# Patient Record
Sex: FEMALE | Race: BLACK OR AFRICAN AMERICAN | NOT HISPANIC OR LATINO
[De-identification: names, ages, dates, MRNs, and addresses within clinical notes are randomized per-mention and may not be internally consistent; named-entity substitution may affect disease eponyms.]

---

## 2017-02-13 ENCOUNTER — APPOINTMENT (OUTPATIENT)
Dept: INTERNAL MEDICINE | Facility: CLINIC | Age: 42
End: 2017-02-13

## 2017-02-13 VITALS
HEART RATE: 86 BPM | WEIGHT: 124 LBS | SYSTOLIC BLOOD PRESSURE: 100 MMHG | DIASTOLIC BLOOD PRESSURE: 72 MMHG | BODY MASS INDEX: 21.7 KG/M2 | OXYGEN SATURATION: 98 % | HEIGHT: 63.5 IN

## 2017-02-13 VITALS — TEMPERATURE: 97.9 F

## 2017-02-14 LAB
ALBUMIN SERPL ELPH-MCNC: 3.8 G/DL
ALP BLD-CCNC: 41 U/L
ALT SERPL-CCNC: 8 U/L
ANION GAP SERPL CALC-SCNC: 15 MMOL/L
AST SERPL-CCNC: 13 U/L
BASOPHILS # BLD AUTO: 0.02 K/UL
BASOPHILS NFR BLD AUTO: 0.4 %
BILIRUB SERPL-MCNC: 0.2 MG/DL
BUN SERPL-MCNC: 12 MG/DL
C TRACH DNA SPEC QL NAA+PROBE: NORMAL
C TRACH RRNA SPEC QL NAA+PROBE: NORMAL
CALCIUM SERPL-MCNC: 9.3 MG/DL
CANDIDA VAG CYTO: NOT DETECTED
CHLORIDE SERPL-SCNC: 102 MMOL/L
CHOLEST SERPL-MCNC: 233 MG/DL
CHOLEST/HDLC SERPL: 4 RATIO
CO2 SERPL-SCNC: 23 MMOL/L
CREAT SERPL-MCNC: 0.92 MG/DL
EOSINOPHIL # BLD AUTO: 0.04 K/UL
EOSINOPHIL NFR BLD AUTO: 0.8 %
G VAGINALIS+PREV SP MTYP VAG QL MICRO: NOT DETECTED
GLUCOSE SERPL-MCNC: 93 MG/DL
HBA1C MFR BLD HPLC: 5.5 %
HCT VFR BLD CALC: 42.2 %
HDLC SERPL-MCNC: 58 MG/DL
HGB BLD-MCNC: 13.8 G/DL
HIV1+2 AB SPEC QL IA.RAPID: NONREACTIVE
IMM GRANULOCYTES NFR BLD AUTO: 0.4 %
LDLC SERPL CALC-MCNC: 156 MG/DL
LYMPHOCYTES # BLD AUTO: 2.78 K/UL
LYMPHOCYTES NFR BLD AUTO: 55.6 %
MAN DIFF?: NORMAL
MCHC RBC-ENTMCNC: 26.2 PG
MCHC RBC-ENTMCNC: 32.7 GM/DL
MCV RBC AUTO: 80.2 FL
MONOCYTES # BLD AUTO: 0.56 K/UL
MONOCYTES NFR BLD AUTO: 11.2 %
N GONORRHOEA DNA SPEC QL NAA+PROBE: NORMAL
N GONORRHOEA RRNA SPEC QL NAA+PROBE: NORMAL
NEUTROPHILS # BLD AUTO: 1.58 K/UL
NEUTROPHILS NFR BLD AUTO: 31.6 %
PLATELET # BLD AUTO: 249 K/UL
POTASSIUM SERPL-SCNC: 4 MMOL/L
PROT SERPL-MCNC: 7.2 G/DL
RBC # BLD: 5.26 M/UL
RBC # FLD: 13.8 %
SODIUM SERPL-SCNC: 140 MMOL/L
SOURCE AMPLIFICATION: NORMAL
T VAGINALIS VAG QL WET PREP: NOT DETECTED
TRIGL SERPL-MCNC: 93 MG/DL
TSH SERPL-ACNC: 1.72 UIU/ML
WBC # FLD AUTO: 5 K/UL

## 2017-08-07 ENCOUNTER — APPOINTMENT (OUTPATIENT)
Dept: INTERNAL MEDICINE | Facility: CLINIC | Age: 42
End: 2017-08-07
Payer: COMMERCIAL

## 2017-08-07 VITALS
SYSTOLIC BLOOD PRESSURE: 90 MMHG | DIASTOLIC BLOOD PRESSURE: 60 MMHG | HEART RATE: 77 BPM | WEIGHT: 134 LBS | TEMPERATURE: 98.5 F | HEIGHT: 63.78 IN | BODY MASS INDEX: 23.16 KG/M2 | OXYGEN SATURATION: 98 %

## 2017-08-07 DIAGNOSIS — Z87.09 PERSONAL HISTORY OF OTHER DISEASES OF THE RESPIRATORY SYSTEM: ICD-10-CM

## 2017-08-07 DIAGNOSIS — Z87.42 PERSONAL HISTORY OF OTHER DISEASES OF THE FEMALE GENITAL TRACT: ICD-10-CM

## 2017-08-07 PROCEDURE — 99396 PREV VISIT EST AGE 40-64: CPT

## 2017-08-07 RX ORDER — CHROMIUM 200 MCG
TABLET ORAL
Refills: 0 | Status: ACTIVE | COMMUNITY

## 2017-08-07 RX ORDER — ASCORBIC ACID 500 MG
TABLET ORAL
Refills: 0 | Status: ACTIVE | COMMUNITY

## 2018-04-20 ENCOUNTER — APPOINTMENT (OUTPATIENT)
Dept: INTERNAL MEDICINE | Facility: CLINIC | Age: 43
End: 2018-04-20
Payer: COMMERCIAL

## 2018-04-20 VITALS
HEART RATE: 85 BPM | HEIGHT: 63.78 IN | DIASTOLIC BLOOD PRESSURE: 70 MMHG | SYSTOLIC BLOOD PRESSURE: 90 MMHG | WEIGHT: 133 LBS | OXYGEN SATURATION: 98 % | BODY MASS INDEX: 22.99 KG/M2

## 2018-04-20 PROCEDURE — 99213 OFFICE O/P EST LOW 20 MIN: CPT

## 2019-03-11 ENCOUNTER — APPOINTMENT (OUTPATIENT)
Dept: INTERNAL MEDICINE | Facility: CLINIC | Age: 44
End: 2019-03-11
Payer: COMMERCIAL

## 2019-03-11 VITALS
SYSTOLIC BLOOD PRESSURE: 102 MMHG | WEIGHT: 132 LBS | BODY MASS INDEX: 22.81 KG/M2 | HEART RATE: 75 BPM | DIASTOLIC BLOOD PRESSURE: 64 MMHG | TEMPERATURE: 98 F | OXYGEN SATURATION: 98 %

## 2019-03-11 DIAGNOSIS — J30.2 OTHER SEASONAL ALLERGIC RHINITIS: ICD-10-CM

## 2019-03-11 DIAGNOSIS — J33.9 NASAL POLYP, UNSPECIFIED: ICD-10-CM

## 2019-03-11 DIAGNOSIS — Z87.39 PERSONAL HISTORY OF OTHER DISEASES OF THE MUSCULOSKELETAL SYSTEM AND CONNECTIVE TISSUE: ICD-10-CM

## 2019-03-11 DIAGNOSIS — H61.21 IMPACTED CERUMEN, RIGHT EAR: ICD-10-CM

## 2019-03-11 DIAGNOSIS — G44.209 TENSION-TYPE HEADACHE, UNSPECIFIED, NOT INTRACTABLE: ICD-10-CM

## 2019-03-11 DIAGNOSIS — N95.1 MENOPAUSAL AND FEMALE CLIMACTERIC STATES: ICD-10-CM

## 2019-03-11 DIAGNOSIS — N94.6 DYSMENORRHEA, UNSPECIFIED: ICD-10-CM

## 2019-03-11 PROCEDURE — 36415 COLL VENOUS BLD VENIPUNCTURE: CPT

## 2019-03-11 PROCEDURE — 99396 PREV VISIT EST AGE 40-64: CPT | Mod: 25

## 2019-03-11 RX ORDER — CYCLOBENZAPRINE HYDROCHLORIDE 10 MG/1
10 TABLET, FILM COATED ORAL
Qty: 7 | Refills: 0 | Status: DISCONTINUED | COMMUNITY
Start: 2018-04-20 | End: 2019-03-11

## 2019-03-11 RX ORDER — IBUPROFEN 600 MG/1
600 TABLET, FILM COATED ORAL 3 TIMES DAILY
Qty: 15 | Refills: 0 | Status: DISCONTINUED | COMMUNITY
Start: 2018-04-20 | End: 2019-03-11

## 2019-03-11 NOTE — REVIEW OF SYSTEMS
[Headache] : headache [Fever] : no fever [Night Sweats] : no night sweats [Discharge] : no discharge [Vision Problems] : no vision problems [Earache] : no earache [Nasal Discharge] : no nasal discharge [Chest Pain] : no chest pain [Orthopnea] : no orthopnea [Shortness Of Breath] : no shortness of breath [Abdominal Pain] : no abdominal pain [Vomiting] : no vomiting [Dysuria] : no dysuria [Joint Pain] : no joint pain [Skin Rash] : no skin rash [Depression] : no depression

## 2019-03-11 NOTE — HISTORY OF PRESENT ILLNESS
[FreeTextEntry1] : cpe [de-identified] : 1. Emotional lability moods better this year\par 2. Allergies better \par 3. Tension hedaches still has frontal not related to food. not severe. enough to notice discmofort. tea and stress relief help. no clear triggers no chg vision, n/v, difficulties walking\par 4. HM utd mammo and pap. will check tdap records. does not do fly shot. \par 5. sweats occ day and night no wt chg utd mammo, pap, no enlarged ln, no drenching sweats no fevers suspects menopausal does not know fhx menopause timing. \par

## 2019-03-11 NOTE — ASSESSMENT
[FreeTextEntry1] : 1. Emotional lability moods better this year\par 2. Allergies better  \par 3. Tension headaches ha log. \par 4. HM utd mammo and pap. will check tdap records. does not do fly shot. utd dental, ophtho\par 5. sweats suspect menopause sx check labs including hormone levels. utd routine ca screening\par 6. nasal polyps bl fu ent \par 7. cerumen R ear debrox

## 2019-03-11 NOTE — PHYSICAL EXAM
[No Acute Distress] : no acute distress [Normal Sclera/Conjunctiva] : normal sclera/conjunctiva [PERRL] : pupils equal round and reactive to light [EOMI] : extraocular movements intact [Normal Outer Ear/Nose] : the outer ears and nose were normal in appearance [Normal Oropharynx] : the oropharynx was normal [Supple] : supple [No Lymphadenopathy] : no lymphadenopathy [Thyroid Normal, No Nodules] : the thyroid was normal and there were no nodules present [No Respiratory Distress] : no respiratory distress  [Clear to Auscultation] : lungs were clear to auscultation bilaterally [No Accessory Muscle Use] : no accessory muscle use [Normal Rate] : normal rate  [Regular Rhythm] : with a regular rhythm [Normal S1, S2] : normal S1 and S2 [Pedal Pulses Present] : the pedal pulses are present [No Edema] : there was no peripheral edema [Normal Appearance] : normal in appearance [No Masses] : no palpable masses [No Nipple Discharge] : no nipple discharge [No Axillary Lymphadenopathy] : no axillary lymphadenopathy [Normal Supraclavicular Nodes] : no supraclavicular lymphadenopathy [Normal Axillary Nodes] : no axillary lymphadenopathy [Normal Posterior Cervical Nodes] : no posterior cervical lymphadenopathy [Normal Anterior Cervical Nodes] : no anterior cervical lymphadenopathy [No CVA Tenderness] : no CVA  tenderness [No Spinal Tenderness] : no spinal tenderness [No Joint Swelling] : no joint swelling [No Rash] : no rash [Normal Gait] : normal gait [Coordination Grossly Intact] : coordination grossly intact [No Focal Deficits] : no focal deficits [Normal Mood] : the mood was normal [de-identified] : ansal polyps bl. R cerumen impaction [de-identified] : well healed scars inf bl breasts [de-identified] : cn 2-12 intact. nl strength/sensation bl le and ue

## 2019-03-12 LAB
25(OH)D3 SERPL-MCNC: 29.9 NG/ML
ALBUMIN SERPL ELPH-MCNC: 4.4 G/DL
ALP BLD-CCNC: 48 U/L
ALT SERPL-CCNC: 8 U/L
ANION GAP SERPL CALC-SCNC: 11 MMOL/L
AST SERPL-CCNC: 15 U/L
BASOPHILS # BLD AUTO: 0.03 K/UL
BASOPHILS NFR BLD AUTO: 0.6 %
BILIRUB SERPL-MCNC: 0.4 MG/DL
BUN SERPL-MCNC: 9 MG/DL
CALCIUM SERPL-MCNC: 9.7 MG/DL
CHLORIDE SERPL-SCNC: 104 MMOL/L
CHOLEST SERPL-MCNC: 231 MG/DL
CHOLEST/HDLC SERPL: 3.2 RATIO
CO2 SERPL-SCNC: 25 MMOL/L
CREAT SERPL-MCNC: 1.01 MG/DL
EOSINOPHIL # BLD AUTO: 0.19 K/UL
EOSINOPHIL NFR BLD AUTO: 3.8 %
GLUCOSE SERPL-MCNC: 88 MG/DL
HBA1C MFR BLD HPLC: 5.1 %
HCT VFR BLD CALC: 43.7 %
HDLC SERPL-MCNC: 73 MG/DL
HGB BLD-MCNC: 13.7 G/DL
IMM GRANULOCYTES NFR BLD AUTO: 0.2 %
LDLC SERPL CALC-MCNC: 142 MG/DL
LH SERPL-ACNC: 8.4 IU/L
LYMPHOCYTES # BLD AUTO: 2.23 K/UL
LYMPHOCYTES NFR BLD AUTO: 44.7 %
MAN DIFF?: NORMAL
MCHC RBC-ENTMCNC: 25.6 PG
MCHC RBC-ENTMCNC: 31.4 GM/DL
MCV RBC AUTO: 81.5 FL
MONOCYTES # BLD AUTO: 0.38 K/UL
MONOCYTES NFR BLD AUTO: 7.6 %
NEUTROPHILS # BLD AUTO: 2.15 K/UL
NEUTROPHILS NFR BLD AUTO: 43.1 %
PLATELET # BLD AUTO: 275 K/UL
POTASSIUM SERPL-SCNC: 4.2 MMOL/L
PROT SERPL-MCNC: 7.1 G/DL
RBC # BLD: 5.36 M/UL
RBC # FLD: 13.5 %
SODIUM SERPL-SCNC: 140 MMOL/L
TRIGL SERPL-MCNC: 79 MG/DL
TSH SERPL-ACNC: 1.76 UIU/ML
WBC # FLD AUTO: 4.99 K/UL

## 2019-03-16 ENCOUNTER — MOBILE ON CALL (OUTPATIENT)
Age: 44
End: 2019-03-16

## 2019-03-18 LAB — FSH: 5.5 MIU/ML

## 2019-05-13 ENCOUNTER — APPOINTMENT (OUTPATIENT)
Dept: ULTRASOUND IMAGING | Facility: IMAGING CENTER | Age: 44
End: 2019-05-13
Payer: COMMERCIAL

## 2019-05-13 ENCOUNTER — APPOINTMENT (OUTPATIENT)
Dept: MAMMOGRAPHY | Facility: IMAGING CENTER | Age: 44
End: 2019-05-13
Payer: COMMERCIAL

## 2019-05-13 ENCOUNTER — OUTPATIENT (OUTPATIENT)
Dept: OUTPATIENT SERVICES | Facility: HOSPITAL | Age: 44
LOS: 1 days | End: 2019-05-13
Payer: COMMERCIAL

## 2019-05-13 DIAGNOSIS — Z00.8 ENCOUNTER FOR OTHER GENERAL EXAMINATION: ICD-10-CM

## 2019-05-13 PROCEDURE — 76641 ULTRASOUND BREAST COMPLETE: CPT | Mod: 26,50

## 2019-05-13 PROCEDURE — 77063 BREAST TOMOSYNTHESIS BI: CPT | Mod: 26

## 2019-05-13 PROCEDURE — 77067 SCR MAMMO BI INCL CAD: CPT | Mod: 26

## 2019-05-13 PROCEDURE — 77067 SCR MAMMO BI INCL CAD: CPT

## 2019-05-13 PROCEDURE — 77063 BREAST TOMOSYNTHESIS BI: CPT

## 2019-05-13 PROCEDURE — 76641 ULTRASOUND BREAST COMPLETE: CPT

## 2019-08-26 ENCOUNTER — MOBILE ON CALL (OUTPATIENT)
Age: 44
End: 2019-08-26

## 2019-08-28 ENCOUNTER — APPOINTMENT (OUTPATIENT)
Dept: INTERNAL MEDICINE | Facility: CLINIC | Age: 44
End: 2019-08-28
Payer: COMMERCIAL

## 2019-08-28 ENCOUNTER — CLINICAL ADVICE (OUTPATIENT)
Age: 44
End: 2019-08-28

## 2019-08-28 VITALS
OXYGEN SATURATION: 98 % | HEART RATE: 71 BPM | SYSTOLIC BLOOD PRESSURE: 122 MMHG | TEMPERATURE: 98.5 F | DIASTOLIC BLOOD PRESSURE: 66 MMHG | BODY MASS INDEX: 22.36 KG/M2 | WEIGHT: 131 LBS | HEIGHT: 64 IN

## 2019-08-28 PROCEDURE — 99213 OFFICE O/P EST LOW 20 MIN: CPT

## 2019-08-28 NOTE — PHYSICAL EXAM
[No Edema] : there was no peripheral edema [de-identified] : SEE BELOW [Normal] : no rash [de-identified] : FROM neck and L shoulder , tightness L SCM and in the paraspinal muscles

## 2019-08-28 NOTE — HISTORY OF PRESENT ILLNESS
[FreeTextEntry8] : pt is here bc she has had  neck pain radiating to mainly the L shoulder x 2 week \par it started off as a crick when she woke up one day , almost 2 weeks ago , the pt had restricted ROM initially but over the days the ROM  has improved but she has the achiness/ tightness/ heaviness radiating to L shoulder \par feels worse when she extends or flexes her neck \par the  discomfort on the L  is 10/10 \par on the R , 7/10 \par she also has tingling in the LUE \par She has tried OTC aleve , advil and tylenol without much relief \par has used both heat and ice with minimal relief \par pt is R  handed \par pt reports that bc discomfort in the L shoulder , she cannot sleep \par the L shoulder discomfort is constant \par

## 2019-08-28 NOTE — ASSESSMENT
[FreeTextEntry1] : 1) neck pain \par \par - diclofenac 50 BID \par - baclofen 10 mg \par - local heat

## 2019-09-03 ENCOUNTER — APPOINTMENT (OUTPATIENT)
Dept: INTERNAL MEDICINE | Facility: CLINIC | Age: 44
End: 2019-09-03

## 2019-09-04 ENCOUNTER — MOBILE ON CALL (OUTPATIENT)
Age: 44
End: 2019-09-04

## 2019-09-10 ENCOUNTER — APPOINTMENT (OUTPATIENT)
Dept: INTERNAL MEDICINE | Facility: CLINIC | Age: 44
End: 2019-09-10
Payer: COMMERCIAL

## 2019-09-10 VITALS
DIASTOLIC BLOOD PRESSURE: 72 MMHG | BODY MASS INDEX: 22.71 KG/M2 | WEIGHT: 133 LBS | HEART RATE: 81 BPM | HEIGHT: 64 IN | SYSTOLIC BLOOD PRESSURE: 120 MMHG | TEMPERATURE: 98.4 F | OXYGEN SATURATION: 98 %

## 2019-09-10 DIAGNOSIS — M54.12 RADICULOPATHY, CERVICAL REGION: ICD-10-CM

## 2019-09-10 PROCEDURE — 99214 OFFICE O/P EST MOD 30 MIN: CPT

## 2019-09-11 NOTE — ASSESSMENT
[FreeTextEntry1] : Neck pain with radiation, numbness L hand suspicious for cervical radiculopathy. no muscle tenderness trap/scm or shoulder or upper arm pain to suggest shoulder or muscular etiology will therefore send for cervical spine mri. rec oral steroid course pt decllined. will call if any wrosening and to ed if any new neurologic sx.

## 2019-09-11 NOTE — HISTORY OF PRESENT ILLNESS
[FreeTextEntry1] : Neck and arm pain [de-identified] : Pt notes that 2-3 w ago crick in her neck when she woke up on L side traveling down L arm. Since then has had intermittent pain in L upper shoulder/paraspinal area worse with looking up. No trauma/falls. Pain is intermittent in shoulder but constant in upper L arm as well as numbness in thumb and forefinger of L hand. no swelling. no shoulder pain. no hx back pain. no fever. no sob, cough, cp. no swelling/color change. tried massage, nsaids no help. baclofen no help. heat packs helped a little. no weakness/clumsiness. no ha. no leg pain. 10 point review of systems reviewed and negative except as above

## 2019-09-11 NOTE — PHYSICAL EXAM
[No Acute Distress] : no acute distress [Normal Sclera/Conjunctiva] : normal sclera/conjunctiva [PERRL] : pupils equal round and reactive to light [No Lymphadenopathy] : no lymphadenopathy [Supple] : supple [No Accessory Muscle Use] : no accessory muscle use [Normal Rate] : normal rate  [Regular Rhythm] : with a regular rhythm [No Carotid Bruits] : no carotid bruits [No Edema] : there was no peripheral edema [No Spinal Tenderness] : no spinal tenderness [de-identified] : nl radial pulse bl  [de-identified] : no cervical ttp or paraspinal ttp. nl rom neck side to side and lat bending.  [de-identified] : shoulder no deformity nl rom neg drop arm, figueroa wale, soda can test  no ttp throughout arm. no ttp trap or scm  [de-identified] : nl strength/gross sensation bl ue

## 2019-09-20 DIAGNOSIS — M47.12 OTHER SPONDYLOSIS WITH MYELOPATHY, CERVICAL REGION: ICD-10-CM

## 2019-09-24 ENCOUNTER — TRANSCRIPTION ENCOUNTER (OUTPATIENT)
Age: 44
End: 2019-09-24

## 2019-09-26 ENCOUNTER — APPOINTMENT (OUTPATIENT)
Dept: ORTHOPEDIC SURGERY | Facility: CLINIC | Age: 44
End: 2019-09-26
Payer: COMMERCIAL

## 2019-09-26 DIAGNOSIS — M50.30 OTHER CERVICAL DISC DEGENERATION, UNSPECIFIED CERVICAL REGION: ICD-10-CM

## 2019-09-26 PROCEDURE — 99204 OFFICE O/P NEW MOD 45 MIN: CPT

## 2019-09-26 PROCEDURE — 72040 X-RAY EXAM NECK SPINE 2-3 VW: CPT

## 2019-09-30 ENCOUNTER — NON-APPOINTMENT (OUTPATIENT)
Age: 44
End: 2019-09-30

## 2019-09-30 ENCOUNTER — APPOINTMENT (OUTPATIENT)
Dept: OPHTHALMOLOGY | Facility: CLINIC | Age: 44
End: 2019-09-30
Payer: COMMERCIAL

## 2019-09-30 PROCEDURE — 92014 COMPRE OPH EXAM EST PT 1/>: CPT

## 2020-09-21 ENCOUNTER — NON-APPOINTMENT (OUTPATIENT)
Age: 45
End: 2020-09-21

## 2020-09-21 ENCOUNTER — APPOINTMENT (OUTPATIENT)
Dept: OPHTHALMOLOGY | Facility: CLINIC | Age: 45
End: 2020-09-21
Payer: COMMERCIAL

## 2020-09-21 PROCEDURE — 92014 COMPRE OPH EXAM EST PT 1/>: CPT

## 2022-03-28 ENCOUNTER — APPOINTMENT (OUTPATIENT)
Dept: INTERNAL MEDICINE | Facility: CLINIC | Age: 47
End: 2022-03-28
Payer: COMMERCIAL

## 2022-03-28 VITALS
OXYGEN SATURATION: 98 % | SYSTOLIC BLOOD PRESSURE: 102 MMHG | BODY MASS INDEX: 23.52 KG/M2 | TEMPERATURE: 98 F | DIASTOLIC BLOOD PRESSURE: 70 MMHG | WEIGHT: 137 LBS | HEART RATE: 93 BPM

## 2022-03-28 DIAGNOSIS — R14.3 FLATULENCE: ICD-10-CM

## 2022-03-28 DIAGNOSIS — S16.1XXA STRAIN OF MUSCLE, FASCIA AND TENDON AT NECK LEVEL, INITIAL ENCOUNTER: ICD-10-CM

## 2022-03-28 DIAGNOSIS — N89.8 OTHER SPECIFIED NONINFLAMMATORY DISORDERS OF VAGINA: ICD-10-CM

## 2022-03-28 DIAGNOSIS — Z87.898 PERSONAL HISTORY OF OTHER SPECIFIED CONDITIONS: ICD-10-CM

## 2022-03-28 PROCEDURE — 99214 OFFICE O/P EST MOD 30 MIN: CPT | Mod: 25

## 2022-03-28 PROCEDURE — 99396 PREV VISIT EST AGE 40-64: CPT

## 2022-03-28 RX ORDER — GABAPENTIN 100 MG/1
100 CAPSULE ORAL
Qty: 90 | Refills: 1 | Status: DISCONTINUED | COMMUNITY
Start: 2019-09-26 | End: 2022-03-28

## 2022-03-28 RX ORDER — MELOXICAM 15 MG/1
15 TABLET ORAL
Qty: 30 | Refills: 1 | Status: DISCONTINUED | COMMUNITY
Start: 2019-09-26 | End: 2022-03-28

## 2022-03-28 RX ORDER — DICLOFENAC SODIUM 50 MG/1
50 TABLET, DELAYED RELEASE ORAL
Qty: 20 | Refills: 0 | Status: DISCONTINUED | COMMUNITY
Start: 2019-08-28 | End: 2022-03-28

## 2022-03-28 RX ORDER — BACLOFEN 10 MG/1
10 TABLET ORAL TWICE DAILY
Qty: 14 | Refills: 0 | Status: DISCONTINUED | COMMUNITY
Start: 2019-08-28 | End: 2022-03-28

## 2022-03-28 RX ORDER — ASPIRIN 81 MG
6.5 TABLET, DELAYED RELEASE (ENTERIC COATED) ORAL
Qty: 1 | Refills: 1 | Status: DISCONTINUED | COMMUNITY
Start: 2019-03-11 | End: 2022-03-28

## 2022-03-28 NOTE — HISTORY OF PRESENT ILLNESS
[de-identified] : 1. allergies seasonal takes xyzal prn\par 2.neck comes and goes hot shower helps. has no time for exercises\par 3. HM due for mammo and pap and colo. utd covid x 3. utd eye doctor has dental appt. skin ok. walks 45min at work. \par 5. sweats occ day and night no wt chg  no fevers. periods regular. only underarms sweat. \par 6. stress due to school/work. moods ok. \par 7. gas does note lactose intoelrance takes lactaid occ . lots of leafy greens. no constipation. no dysuria. no blaoting. \par 8. vaginal discharge. no new partners. no irritation or itching. periods regualr. \par

## 2022-03-28 NOTE — ASSESSMENT
[FreeTextEntry1] : 1. allergies seasonal cont xyzal prn\par 2.neck rec ecercsis, heat, posture\par 3. HM due for mammo/us and pap and colo she will schedule. utd covid x 3. utd eye doctor has dental appt. skin ok. walks 45min at work. \par 5. sweats likely focal hyperhidrosis check bloodwork, cxr, abd u/s\par 6. stress due to school/work. moods ok. discussed stress mgmt\par 7. gas rec lactose free 2-3w trial. avoid raw greens and baens. printed foods to avoid. fu gi \par 8. vaginal discharge bv and gc sent. check ua.

## 2022-03-28 NOTE — PHYSICAL EXAM
[No Acute Distress] : no acute distress [Well Nourished] : well nourished [Well Developed] : well developed [Well-Appearing] : well-appearing [Normal Sclera/Conjunctiva] : normal sclera/conjunctiva [PERRL] : pupils equal round and reactive to light [EOMI] : extraocular movements intact [Normal Outer Ear/Nose] : the outer ears and nose were normal in appearance [Normal Oropharynx] : the oropharynx was normal [No JVD] : no jugular venous distention [No Lymphadenopathy] : no lymphadenopathy [Supple] : supple [Thyroid Normal, No Nodules] : the thyroid was normal and there were no nodules present [No Respiratory Distress] : no respiratory distress  [Clear to Auscultation] : lungs were clear to auscultation bilaterally [No Accessory Muscle Use] : no accessory muscle use [Normal Rate] : normal rate  [Regular Rhythm] : with a regular rhythm [Normal S1, S2] : normal S1 and S2 [No Murmur] : no murmur heard [No Carotid Bruits] : no carotid bruits [No Abdominal Bruit] : a ~M bruit was not heard ~T in the abdomen [No Varicosities] : no varicosities [Pedal Pulses Present] : the pedal pulses are present [No Edema] : there was no peripheral edema [No Palpable Aorta] : no palpable aorta [No Extremity Clubbing/Cyanosis] : no extremity clubbing/cyanosis [Normal Appearance] : normal in appearance [No Nipple Discharge] : no nipple discharge [No Axillary Lymphadenopathy] : no axillary lymphadenopathy [Soft] : abdomen soft [Non Tender] : non-tender [Non-distended] : non-distended [No Masses] : no abdominal mass palpated [No HSM] : no HSM [Normal Bowel Sounds] : normal bowel sounds [Urethral Meatus] : normal urethra [Vagina] : normal vaginal exam [Normal Posterior Cervical Nodes] : no posterior cervical lymphadenopathy [Normal Anterior Cervical Nodes] : no anterior cervical lymphadenopathy [No CVA Tenderness] : no CVA  tenderness [No Spinal Tenderness] : no spinal tenderness [No Joint Swelling] : no joint swelling [Grossly Normal Strength/Tone] : grossly normal strength/tone [No Rash] : no rash [Coordination Grossly Intact] : coordination grossly intact [No Focal Deficits] : no focal deficits [Normal Gait] : normal gait [Deep Tendon Reflexes (DTR)] : deep tendon reflexes were 2+ and symmetric [Normal Affect] : the affect was normal [Normal Mood] : the mood was normal [Normal Insight/Judgement] : insight and judgment were intact

## 2022-03-29 LAB
25(OH)D3 SERPL-MCNC: 18.5 NG/ML
ALBUMIN SERPL ELPH-MCNC: 4.1 G/DL
ALP BLD-CCNC: 66 U/L
ALT SERPL-CCNC: 11 U/L
ANION GAP SERPL CALC-SCNC: 12 MMOL/L
APPEARANCE: CLEAR
AST SERPL-CCNC: 19 U/L
BASOPHILS # BLD AUTO: 0.04 K/UL
BASOPHILS NFR BLD AUTO: 0.4 %
BILIRUB SERPL-MCNC: 0.2 MG/DL
BILIRUBIN URINE: NEGATIVE
BLOOD URINE: NEGATIVE
BUN SERPL-MCNC: 12 MG/DL
C TRACH RRNA SPEC QL NAA+PROBE: NOT DETECTED
CALCIUM SERPL-MCNC: 9.4 MG/DL
CANDIDA VAG CYTO: NOT DETECTED
CHLORIDE SERPL-SCNC: 101 MMOL/L
CHOLEST SERPL-MCNC: 246 MG/DL
CO2 SERPL-SCNC: 23 MMOL/L
COLOR: NORMAL
CREAT SERPL-MCNC: 0.84 MG/DL
EGFR: 87 ML/MIN/1.73M2
EOSINOPHIL # BLD AUTO: 0.1 K/UL
EOSINOPHIL NFR BLD AUTO: 1 %
ESTIMATED AVERAGE GLUCOSE: 111 MG/DL
G VAGINALIS+PREV SP MTYP VAG QL MICRO: DETECTED
GLUCOSE QUALITATIVE U: NEGATIVE
GLUCOSE SERPL-MCNC: 79 MG/DL
HBA1C MFR BLD HPLC: 5.5 %
HCT VFR BLD CALC: 41.8 %
HDLC SERPL-MCNC: 86 MG/DL
HGB BLD-MCNC: 12.5 G/DL
IMM GRANULOCYTES NFR BLD AUTO: 0.5 %
KETONES URINE: NORMAL
LDLC SERPL CALC-MCNC: 146 MG/DL
LEUKOCYTE ESTERASE URINE: NEGATIVE
LYMPHOCYTES # BLD AUTO: 2.75 K/UL
LYMPHOCYTES NFR BLD AUTO: 26.4 %
MAN DIFF?: NORMAL
MCHC RBC-ENTMCNC: 24.6 PG
MCHC RBC-ENTMCNC: 29.9 GM/DL
MCV RBC AUTO: 82.1 FL
MONOCYTES # BLD AUTO: 0.9 K/UL
MONOCYTES NFR BLD AUTO: 8.6 %
N GONORRHOEA RRNA SPEC QL NAA+PROBE: NOT DETECTED
NEUTROPHILS # BLD AUTO: 6.58 K/UL
NEUTROPHILS NFR BLD AUTO: 63.1 %
NITRITE URINE: NEGATIVE
NONHDLC SERPL-MCNC: 160 MG/DL
PH URINE: 5.5
PLATELET # BLD AUTO: 362 K/UL
POTASSIUM SERPL-SCNC: 4.2 MMOL/L
PROT SERPL-MCNC: 7.6 G/DL
PROTEIN URINE: NEGATIVE
RBC # BLD: 5.09 M/UL
RBC # FLD: 14.6 %
SODIUM SERPL-SCNC: 137 MMOL/L
SOURCE AMPLIFICATION: NORMAL
SPECIFIC GRAVITY URINE: 1.02
T VAGINALIS VAG QL WET PREP: NOT DETECTED
TRIGL SERPL-MCNC: 68 MG/DL
TSH SERPL-ACNC: 1.08 UIU/ML
UROBILINOGEN URINE: NORMAL
WBC # FLD AUTO: 10.42 K/UL

## 2022-03-29 RX ORDER — METRONIDAZOLE 500 MG/1
500 TABLET ORAL TWICE DAILY
Qty: 14 | Refills: 0 | Status: ACTIVE | COMMUNITY
Start: 2022-03-29 | End: 1900-01-01

## 2022-04-04 ENCOUNTER — RESULT REVIEW (OUTPATIENT)
Age: 47
End: 2022-04-04

## 2022-07-01 ENCOUNTER — NON-APPOINTMENT (OUTPATIENT)
Age: 47
End: 2022-07-01

## 2024-04-03 ENCOUNTER — NON-APPOINTMENT (OUTPATIENT)
Age: 49
End: 2024-04-03

## 2024-04-04 ENCOUNTER — NON-APPOINTMENT (OUTPATIENT)
Age: 49
End: 2024-04-04

## 2024-04-04 ENCOUNTER — APPOINTMENT (OUTPATIENT)
Dept: INTERNAL MEDICINE | Facility: CLINIC | Age: 49
End: 2024-04-04
Payer: COMMERCIAL

## 2024-04-04 PROCEDURE — 99442: CPT

## 2024-04-05 ENCOUNTER — APPOINTMENT (OUTPATIENT)
Dept: INTERNAL MEDICINE | Facility: CLINIC | Age: 49
End: 2024-04-05

## 2024-04-08 ENCOUNTER — NON-APPOINTMENT (OUTPATIENT)
Age: 49
End: 2024-04-08

## 2024-06-07 ENCOUNTER — APPOINTMENT (OUTPATIENT)
Dept: INTERNAL MEDICINE | Facility: CLINIC | Age: 49
End: 2024-06-07
Payer: COMMERCIAL

## 2024-06-07 VITALS
DIASTOLIC BLOOD PRESSURE: 74 MMHG | SYSTOLIC BLOOD PRESSURE: 110 MMHG | WEIGHT: 138 LBS | TEMPERATURE: 99 F | BODY MASS INDEX: 23.69 KG/M2 | HEART RATE: 88 BPM

## 2024-06-07 DIAGNOSIS — R61 GENERALIZED HYPERHIDROSIS: ICD-10-CM

## 2024-06-07 DIAGNOSIS — M54.50 LOW BACK PAIN, UNSPECIFIED: ICD-10-CM

## 2024-06-07 DIAGNOSIS — Z00.00 ENCOUNTER FOR GENERAL ADULT MEDICAL EXAMINATION W/OUT ABNORMAL FINDINGS: ICD-10-CM

## 2024-06-07 PROCEDURE — 99396 PREV VISIT EST AGE 40-64: CPT

## 2024-06-07 PROCEDURE — 99214 OFFICE O/P EST MOD 30 MIN: CPT | Mod: 25

## 2024-06-07 RX ORDER — ALUMINUM CHLORIDE
POWDER (GRAM) MISCELLANEOUS
Qty: 1 | Refills: 0 | Status: ACTIVE | COMMUNITY
Start: 2022-03-29 | End: 1900-01-01

## 2024-06-07 RX ORDER — ALUMINUM CHLORIDE 20 %
20 SOLUTION, NON-ORAL TOPICAL
Qty: 35 | Refills: 0 | Status: ACTIVE | COMMUNITY
Start: 2022-03-28 | End: 1900-01-01

## 2024-06-07 NOTE — HISTORY OF PRESENT ILLNESS
[de-identified] : Here as sick more often. due for cpe. sick from work with uri, gi bug this year. 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  had n/v/d now getting back to normal stool after eating oysters, mussels, clams out. sleeps 4h day. eats mostly takeout. wt stable. notes ongoing underarm sweat only. periods regular. not sexaully active.  neck better; hurt lower back. did accupuncture which helped in april.  HM due for mammo and pap and colo. [facility] covid x 3. [facility] eye doctor has dental appt. skin ok. stopped walking this summer for work.

## 2024-06-07 NOTE — PHYSICAL EXAM
[No Acute Distress] : no acute distress [Well Nourished] : well nourished [Well Developed] : well developed [Well-Appearing] : well-appearing [Normal Sclera/Conjunctiva] : normal sclera/conjunctiva [PERRL] : pupils equal round and reactive to light [EOMI] : extraocular movements intact [Normal Outer Ear/Nose] : the outer ears and nose were normal in appearance [Normal Oropharynx] : the oropharynx was normal [No JVD] : no jugular venous distention [No Lymphadenopathy] : no lymphadenopathy [Supple] : supple [Thyroid Normal, No Nodules] : the thyroid was normal and there were no nodules present [No Respiratory Distress] : no respiratory distress  [No Accessory Muscle Use] : no accessory muscle use [Clear to Auscultation] : lungs were clear to auscultation bilaterally [Normal Rate] : normal rate  [Regular Rhythm] : with a regular rhythm [Normal S1, S2] : normal S1 and S2 [No Murmur] : no murmur heard [No Carotid Bruits] : no carotid bruits [No Abdominal Bruit] : a ~M bruit was not heard ~T in the abdomen [No Varicosities] : no varicosities [Pedal Pulses Present] : the pedal pulses are present [No Edema] : there was no peripheral edema [No Palpable Aorta] : no palpable aorta [No Extremity Clubbing/Cyanosis] : no extremity clubbing/cyanosis [Normal Appearance] : normal in appearance [No Nipple Discharge] : no nipple discharge [Soft] : abdomen soft [Non Tender] : non-tender [Non-distended] : non-distended [No Masses] : no abdominal mass palpated [No HSM] : no HSM [Normal Bowel Sounds] : normal bowel sounds [Normal Posterior Cervical Nodes] : no posterior cervical lymphadenopathy [Normal Anterior Cervical Nodes] : no anterior cervical lymphadenopathy [No CVA Tenderness] : no CVA  tenderness [No Spinal Tenderness] : no spinal tenderness [No Joint Swelling] : no joint swelling [Grossly Normal Strength/Tone] : grossly normal strength/tone [No Rash] : no rash [Coordination Grossly Intact] : coordination grossly intact [No Focal Deficits] : no focal deficits [Normal Gait] : normal gait [Deep Tendon Reflexes (DTR)] : deep tendon reflexes were 2+ and symmetric [Normal Affect] : the affect was normal [Normal Insight/Judgement] : insight and judgment were intact [de-identified] : surgical scar

## 2024-06-07 NOTE — ASSESSMENT
[FreeTextEntry1] : 2 episodes of illness in setting of little sleep, lack of exercise, less healthy diet.  gi illness recovering Call if any new or worsening symptoms. rec increasing sleep, meal planning, walking. check labs including ig levels  sweating rec drysol, fu derm. abd u/s due  cont lbp stretching, accupuncture as needed HM due for mammo and pap and colo. utd covid x 3. utd eye doctor has dental appt.  discussed need for preventive healthcare as above.  fu 2m.

## 2024-06-07 NOTE — HEALTH RISK ASSESSMENT
[0] : 2) Feeling down, depressed, or hopeless: Not at all (0) [PHQ-2 Negative - No further assessment needed] : PHQ-2 Negative - No further assessment needed [[id number]] : 0 [Never] : Never

## 2024-06-09 ENCOUNTER — NON-APPOINTMENT (OUTPATIENT)
Age: 49
End: 2024-06-09

## 2024-06-10 ENCOUNTER — RESULT REVIEW (OUTPATIENT)
Age: 49
End: 2024-06-10

## 2024-06-10 ENCOUNTER — APPOINTMENT (OUTPATIENT)
Dept: ULTRASOUND IMAGING | Facility: IMAGING CENTER | Age: 49
End: 2024-06-10
Payer: COMMERCIAL

## 2024-06-10 ENCOUNTER — OUTPATIENT (OUTPATIENT)
Dept: OUTPATIENT SERVICES | Facility: HOSPITAL | Age: 49
LOS: 1 days | End: 2024-06-10
Payer: COMMERCIAL

## 2024-06-10 ENCOUNTER — APPOINTMENT (OUTPATIENT)
Dept: MAMMOGRAPHY | Facility: IMAGING CENTER | Age: 49
End: 2024-06-10
Payer: COMMERCIAL

## 2024-06-10 DIAGNOSIS — R61 GENERALIZED HYPERHIDROSIS: ICD-10-CM

## 2024-06-10 DIAGNOSIS — Z00.00 ENCOUNTER FOR GENERAL ADULT MEDICAL EXAMINATION WITHOUT ABNORMAL FINDINGS: ICD-10-CM

## 2024-06-10 LAB
25(OH)D3 SERPL-MCNC: 15.7 NG/ML
ALBUMIN SERPL ELPH-MCNC: 4.5 G/DL
ALP BLD-CCNC: 54 U/L
ALT SERPL-CCNC: 12 U/L
ANION GAP SERPL CALC-SCNC: 11 MMOL/L
APPEARANCE: CLEAR
AST SERPL-CCNC: 17 U/L
BILIRUB SERPL-MCNC: 0.3 MG/DL
BILIRUBIN URINE: NEGATIVE
BLOOD URINE: NEGATIVE
BUN SERPL-MCNC: 10 MG/DL
CALCIUM SERPL-MCNC: 9.6 MG/DL
CHLORIDE SERPL-SCNC: 104 MMOL/L
CHOLEST SERPL-MCNC: 207 MG/DL
CO2 SERPL-SCNC: 25 MMOL/L
COLOR: YELLOW
CREAT SERPL-MCNC: 0.94 MG/DL
DEPRECATED KAPPA LC FREE/LAMBDA SER: 1.24 RATIO
EGFR: 75 ML/MIN/1.73M2
ESTIMATED AVERAGE GLUCOSE: 111 MG/DL
FSH SERPL-MCNC: 7 IU/L
GLUCOSE QUALITATIVE U: NEGATIVE MG/DL
GLUCOSE SERPL-MCNC: 75 MG/DL
HBA1C MFR BLD HPLC: 5.5 %
HCT VFR BLD CALC: 40.3 %
HDLC SERPL-MCNC: 65 MG/DL
HGB BLD-MCNC: 12.9 G/DL
IGA SER QL IEP: 229 MG/DL
IGG SER QL IEP: 995 MG/DL
IGM SER QL IEP: 179 MG/DL
KAPPA LC CSF-MCNC: 0.99 MG/DL
KAPPA LC SERPL-MCNC: 1.23 MG/DL
KETONES URINE: NEGATIVE MG/DL
LDLC SERPL CALC-MCNC: 127 MG/DL
LEUKOCYTE ESTERASE URINE: NEGATIVE
LH SERPL-ACNC: 10.3 IU/L
MCHC RBC-ENTMCNC: 26.2 PG
MCHC RBC-ENTMCNC: 32 GM/DL
MCV RBC AUTO: 81.9 FL
NITRITE URINE: NEGATIVE
NONHDLC SERPL-MCNC: 142 MG/DL
PH URINE: 5.5
PLATELET # BLD AUTO: 357 K/UL
POTASSIUM SERPL-SCNC: 4.3 MMOL/L
PROT SERPL-MCNC: 7.4 G/DL
PROTEIN URINE: NEGATIVE MG/DL
RBC # BLD: 4.92 M/UL
RBC # FLD: 14.8 %
SODIUM SERPL-SCNC: 141 MMOL/L
SPECIFIC GRAVITY URINE: 1.02
TRIGL SERPL-MCNC: 83 MG/DL
TSH SERPL-ACNC: 0.95 UIU/ML
UROBILINOGEN URINE: 0.2 MG/DL
VIT B12 SERPL-MCNC: 375 PG/ML
WBC # FLD AUTO: 7.05 K/UL

## 2024-06-10 PROCEDURE — 76700 US EXAM ABDOM COMPLETE: CPT | Mod: 26

## 2024-06-10 PROCEDURE — 77067 SCR MAMMO BI INCL CAD: CPT | Mod: 26

## 2024-06-10 PROCEDURE — 76641 ULTRASOUND BREAST COMPLETE: CPT | Mod: 26,50

## 2024-06-10 PROCEDURE — 77063 BREAST TOMOSYNTHESIS BI: CPT | Mod: 26

## 2024-06-10 PROCEDURE — 76641 ULTRASOUND BREAST COMPLETE: CPT

## 2024-06-10 PROCEDURE — 77063 BREAST TOMOSYNTHESIS BI: CPT

## 2024-06-10 PROCEDURE — 77067 SCR MAMMO BI INCL CAD: CPT

## 2024-06-10 PROCEDURE — 76700 US EXAM ABDOM COMPLETE: CPT

## 2024-06-13 LAB
TTG IGA SER IA-ACNC: <0.5 U/ML
TTG IGA SER-ACNC: NEGATIVE

## 2024-06-20 ENCOUNTER — NON-APPOINTMENT (OUTPATIENT)
Age: 49
End: 2024-06-20

## 2024-08-07 ENCOUNTER — APPOINTMENT (OUTPATIENT)
Dept: INTERNAL MEDICINE | Facility: CLINIC | Age: 49
End: 2024-08-07

## 2024-08-07 PROCEDURE — 99441: CPT

## 2024-10-28 ENCOUNTER — APPOINTMENT (OUTPATIENT)
Dept: UROGYNECOLOGY | Facility: CLINIC | Age: 49
End: 2024-10-28
Payer: COMMERCIAL

## 2024-10-28 VITALS
SYSTOLIC BLOOD PRESSURE: 123 MMHG | HEIGHT: 62 IN | BODY MASS INDEX: 26.5 KG/M2 | HEART RATE: 69 BPM | DIASTOLIC BLOOD PRESSURE: 72 MMHG | WEIGHT: 144 LBS

## 2024-10-28 DIAGNOSIS — N36.42 INTRINSIC SPHINCTER DEFICIENCY (ISD): ICD-10-CM

## 2024-10-28 DIAGNOSIS — R35.0 FREQUENCY OF MICTURITION: ICD-10-CM

## 2024-10-28 DIAGNOSIS — N39.3 STRESS INCONTINENCE (FEMALE) (MALE): ICD-10-CM

## 2024-10-28 LAB
BILIRUB UR QL STRIP: NORMAL
CLARITY UR: CLEAR
COLLECTION METHOD: NORMAL
GLUCOSE UR-MCNC: NORMAL
HCG UR QL: 0.2 EU/DL
HGB UR QL STRIP.AUTO: NORMAL
KETONES UR-MCNC: NORMAL
LEUKOCYTE ESTERASE UR QL STRIP: NORMAL
NITRITE UR QL STRIP: NORMAL
PH UR STRIP: 6.5
PROT UR STRIP-MCNC: NORMAL
SP GR UR STRIP: 1.02

## 2024-10-28 PROCEDURE — 99204 OFFICE O/P NEW MOD 45 MIN: CPT | Mod: 25

## 2024-10-28 PROCEDURE — 51701 INSERT BLADDER CATHETER: CPT

## 2024-10-28 PROCEDURE — 99459 PELVIC EXAMINATION: CPT

## 2024-12-12 ENCOUNTER — OUTPATIENT (OUTPATIENT)
Dept: OUTPATIENT SERVICES | Facility: HOSPITAL | Age: 49
LOS: 1 days | End: 2024-12-12
Payer: COMMERCIAL

## 2024-12-12 VITALS
TEMPERATURE: 98 F | RESPIRATION RATE: 12 BRPM | HEART RATE: 98 BPM | DIASTOLIC BLOOD PRESSURE: 70 MMHG | WEIGHT: 147.05 LBS | SYSTOLIC BLOOD PRESSURE: 100 MMHG | HEIGHT: 64 IN | OXYGEN SATURATION: 98 %

## 2024-12-12 DIAGNOSIS — N39.3 STRESS INCONTINENCE (FEMALE) (MALE): ICD-10-CM

## 2024-12-12 DIAGNOSIS — Z01.818 ENCOUNTER FOR OTHER PREPROCEDURAL EXAMINATION: ICD-10-CM

## 2024-12-12 DIAGNOSIS — N36.42 INTRINSIC SPHINCTER DEFICIENCY (ISD): ICD-10-CM

## 2024-12-12 DIAGNOSIS — Z98.82 BREAST IMPLANT STATUS: Chronic | ICD-10-CM

## 2024-12-12 LAB
HCT VFR BLD CALC: 41.8 % — SIGNIFICANT CHANGE UP (ref 34.5–45)
HGB BLD-MCNC: 13 G/DL — SIGNIFICANT CHANGE UP (ref 11.5–15.5)
MCHC RBC-ENTMCNC: 25.3 PG — LOW (ref 27–34)
MCHC RBC-ENTMCNC: 31.1 G/DL — LOW (ref 32–36)
MCV RBC AUTO: 81.3 FL — SIGNIFICANT CHANGE UP (ref 80–100)
NRBC # BLD: 0 /100 WBCS — SIGNIFICANT CHANGE UP (ref 0–0)
PLATELET # BLD AUTO: 287 K/UL — SIGNIFICANT CHANGE UP (ref 150–400)
RBC # BLD: 5.14 M/UL — SIGNIFICANT CHANGE UP (ref 3.8–5.2)
RBC # FLD: 15.2 % — HIGH (ref 10.3–14.5)
WBC # BLD: 5.28 K/UL — SIGNIFICANT CHANGE UP (ref 3.8–10.5)
WBC # FLD AUTO: 5.28 K/UL — SIGNIFICANT CHANGE UP (ref 3.8–10.5)

## 2024-12-12 PROCEDURE — 85027 COMPLETE CBC AUTOMATED: CPT

## 2024-12-12 PROCEDURE — G0463: CPT

## 2024-12-12 PROCEDURE — 36415 COLL VENOUS BLD VENIPUNCTURE: CPT

## 2024-12-12 NOTE — H&P PST ADULT - HISTORY OF PRESENT ILLNESS
49 year old female with no other significant PMH. Patient states she has been having severe symptoms of stress incontinence, multiple times a day for 1-2 years. She presents today to PST prior to scheudled Midurethral Sling, Cystoscopy on 1/2/24. Patient denies recent fever, chills, chest pain, SOB, palpitations, or recent exposure to COVID-19.

## 2024-12-12 NOTE — H&P PST ADULT - PROBLEM SELECTOR PLAN 1
Midurethral Sling, Cystoscopy on 1/2/24.   CBC done today at [facility]. Patient told not to give urine today, UC to be done in Dr. Pierre office on 12/17/24.   Pre op instructions provided and all questions answered.   UCG on DOS, specimen container provided.

## 2024-12-17 ENCOUNTER — APPOINTMENT (OUTPATIENT)
Dept: UROGYNECOLOGY | Facility: CLINIC | Age: 49
End: 2024-12-17
Payer: COMMERCIAL

## 2024-12-17 DIAGNOSIS — N39.3 STRESS INCONTINENCE (FEMALE) (MALE): ICD-10-CM

## 2024-12-17 PROCEDURE — 99214 OFFICE O/P EST MOD 30 MIN: CPT

## 2024-12-17 PROCEDURE — 99459 PELVIC EXAMINATION: CPT

## 2025-01-02 ENCOUNTER — APPOINTMENT (OUTPATIENT)
Dept: UROGYNECOLOGY | Facility: HOSPITAL | Age: 50
End: 2025-01-02
Payer: COMMERCIAL

## 2025-01-02 ENCOUNTER — OUTPATIENT (OUTPATIENT)
Dept: OUTPATIENT SERVICES | Facility: HOSPITAL | Age: 50
LOS: 1 days | End: 2025-01-02
Payer: COMMERCIAL

## 2025-01-02 ENCOUNTER — TRANSCRIPTION ENCOUNTER (OUTPATIENT)
Age: 50
End: 2025-01-02

## 2025-01-02 VITALS
OXYGEN SATURATION: 99 % | TEMPERATURE: 97 F | HEART RATE: 74 BPM | RESPIRATION RATE: 16 BRPM | WEIGHT: 147.05 LBS | HEIGHT: 64 IN | DIASTOLIC BLOOD PRESSURE: 66 MMHG | SYSTOLIC BLOOD PRESSURE: 99 MMHG

## 2025-01-02 VITALS
RESPIRATION RATE: 16 BRPM | DIASTOLIC BLOOD PRESSURE: 75 MMHG | HEART RATE: 82 BPM | OXYGEN SATURATION: 99 % | SYSTOLIC BLOOD PRESSURE: 101 MMHG

## 2025-01-02 DIAGNOSIS — N36.42 INTRINSIC SPHINCTER DEFICIENCY (ISD): ICD-10-CM

## 2025-01-02 DIAGNOSIS — N39.3 STRESS INCONTINENCE (FEMALE) (MALE): ICD-10-CM

## 2025-01-02 DIAGNOSIS — Z98.82 BREAST IMPLANT STATUS: Chronic | ICD-10-CM

## 2025-01-02 PROCEDURE — C1771: CPT

## 2025-01-02 PROCEDURE — 57288 REPAIR BLADDER DEFECT: CPT

## 2025-01-02 DEVICE — SYS SLING MID URET TRANSVAG: Type: IMPLANTABLE DEVICE | Status: FUNCTIONAL

## 2025-01-02 RX ORDER — FENTANYL 75 UG/H
50 PATCH, EXTENDED RELEASE TRANSDERMAL
Refills: 0 | Status: DISCONTINUED | OUTPATIENT
Start: 2025-01-02 | End: 2025-01-02

## 2025-01-02 RX ORDER — OXYCODONE HCL 15 MG
5 TABLET ORAL ONCE
Refills: 0 | Status: DISCONTINUED | OUTPATIENT
Start: 2025-01-02 | End: 2025-01-02

## 2025-01-02 RX ORDER — IBUPROFEN 200 MG
1 TABLET ORAL
Qty: 0 | Refills: 0 | DISCHARGE

## 2025-01-02 RX ORDER — ACETAMINOPHEN 80 MG/.8ML
1000 SOLUTION/ DROPS ORAL ONCE
Refills: 0 | Status: ACTIVE | OUTPATIENT
Start: 2025-01-02

## 2025-01-02 RX ORDER — ONDANSETRON 4 MG/1
4 TABLET ORAL ONCE
Refills: 0 | Status: ACTIVE | OUTPATIENT
Start: 2025-01-02 | End: 2025-12-01

## 2025-01-02 RX ORDER — LIDOCAINE HYDROCHLORIDE 10 MG/ML
0.2 INJECTION INFILTRATION; PERINEURAL ONCE
Refills: 0 | Status: COMPLETED | OUTPATIENT
Start: 2025-01-02 | End: 2025-01-02

## 2025-01-02 RX ORDER — SODIUM CHLORIDE 9 MG/ML
1000 INJECTION, SOLUTION INTRAVENOUS
Refills: 0 | Status: ACTIVE | OUTPATIENT
Start: 2025-01-02 | End: 2025-12-01

## 2025-01-02 RX ORDER — ACETAMINOPHEN 80 MG/.8ML
3 SOLUTION/ DROPS ORAL
Qty: 0 | Refills: 0 | DISCHARGE

## 2025-01-02 RX ORDER — SODIUM CHLORIDE 9 MG/ML
3 INJECTION, SOLUTION INTRAMUSCULAR; INTRAVENOUS; SUBCUTANEOUS EVERY 8 HOURS
Refills: 0 | Status: ACTIVE | OUTPATIENT
Start: 2025-01-02 | End: 2025-12-01

## 2025-01-02 RX ORDER — CEFAZOLIN SODIUM 1 G
2000 VIAL (EA) INJECTION ONCE
Refills: 0 | Status: COMPLETED | OUTPATIENT
Start: 2025-01-02 | End: 2025-01-02

## 2025-01-02 RX ADMIN — SODIUM CHLORIDE 100 MILLILITER(S): 9 INJECTION, SOLUTION INTRAVENOUS at 06:45

## 2025-01-02 RX ADMIN — SODIUM CHLORIDE 3 MILLILITER(S): 9 INJECTION, SOLUTION INTRAMUSCULAR; INTRAVENOUS; SUBCUTANEOUS at 06:02

## 2025-01-02 NOTE — ASU PATIENT PROFILE, ADULT - FALL HARM RISK - UNIVERSAL INTERVENTIONS
Bed in lowest position, wheels locked, appropriate side rails in place/Call bell, personal items and telephone in reach/Instruct patient to call for assistance before getting out of bed or chair/Non-slip footwear when patient is out of bed/[place] to call system/Physically safe environment - no spills, clutter or unnecessary equipment/Purposeful Proactive Rounding/Room/bathroom lighting operational, light cord in reach

## 2025-01-02 NOTE — BRIEF OPERATIVE NOTE - OPERATION/FINDINGS
Retropubic midurethral sling placed without incident. Cytoscopy performed which revealed no evidence of foreign body, mass, suture, mesh, or defect within bladder or urethra. Vigorous efflux of clear yellow urine was noted bilaterally.

## 2025-01-02 NOTE — PRE-ANESTHESIA EVALUATION ADULT - NSANTHBMIRD_ENT_A_CORE
No
Implemented All Universal Safety Interventions:  [place] to call system. Call bell, personal items and telephone within reach. Instruct patient to call for assistance. Room bathroom lighting operational. Non-slip footwear when patient is off stretcher. Physically safe environment: no spills, clutter or unnecessary equipment. Stretcher in lowest position, wheels locked, appropriate side rails in place.

## 2025-01-02 NOTE — ASU DISCHARGE PLAN (ADULT/PEDIATRIC) - ASU DC SPECIAL INSTRUCTIONSFT
Discharge Instructions:  1. Diet: advance as tolerated  2. Activity limited by:   - Nothing in vagina (bath, swim, intercourse, douching, tampons) for 6-8 weeks  3. Pain control  For pain control, take the followin. Motrin 800mg three times a day, or 600mg four times a day, take with food  2. Add Tylenol as needed if still have pain despite Motrin   Motrin and Tylenol can be obtained over the counter.    4. Follow-up appointment - 2 weeks. Please call the office upon discharge to schedule your appointment if you do not have one already.     5. Precautions:  - Call the office or go to the ED if you have any of the followin) Fever >100.4 that does not resolve  2) Intractable pain  3) Heavy bleeding      Postoperative urinary catheter  If you failed your voiding trial in the hospital and are sent home with a catheter, please call the office ([phone]) so you can come in to have a repeat voiding trial/catheter removal in a few days.

## 2025-01-02 NOTE — ASU DISCHARGE PLAN (ADULT/PEDIATRIC) - NS MD DC FALL RISK RISK
For information on Fall & Injury Prevention, visit: [email or web address] OR  [email or web address] OR  https://www.cdc.gov/steadi/patient.html N/A

## 2025-01-02 NOTE — ASU DISCHARGE PLAN (ADULT/PEDIATRIC) - NURSING INSTRUCTIONS
You received IV Tylenol, Ketorolac ( similar as Ibuprofen) in OR today.  You may take Tylenol, up to 1000mg/q6hr, not to exceed 4000mg/day and alternate with Ibuprofen, up to 400mg/q6hr, not to exceed 2400mg/day. You may take either one every 6 hours, you may alternate these medications so that you take one every 3 hours (e.g., Tylenol at 12pm, Ibuprofen at 3pm, Tylenol at 6pm, Ibuprofen at 9pm, etc...). Follow the maximum doses and administration instructions on the bottles.  OK to take Tylenol/Acetaminophen at / afte 1:35PM______ for pain and every 6 hours after as needed.  OK to take Motrin/Ibuprofen at _/ after 1:35PM____ for pain and every 6 hours after as needed.

## 2025-01-02 NOTE — ASU DISCHARGE PLAN (ADULT/PEDIATRIC) - FINANCIAL ASSISTANCE
[facility] provides services at a reduced cost to those who are determined to be eligible through [facility]’s financial assistance program. Information regarding [facility]’s financial assistance program can be found by going to [email or web address] or by calling [phone].

## 2025-01-02 NOTE — ASU DISCHARGE PLAN (ADULT/PEDIATRIC) - CARE PROVIDER_API CALL
Emily Pierre  Urogyn and Reconst Pelvic Surg  [address]  [place] [address]  Phone: [phone]  Fax: [phone]  Follow Up Time: 2 weeks

## 2025-01-02 NOTE — CHART NOTE - NSCHARTNOTEFT_GEN_A_CORE
48yo s/p MUS, Cysto. EBL: 25ml.   Pt eval at bedside for trial of void.  Pain well controlled.      Bladder fully drained through crum catheter.  Retrograde filled with 300cc sterile water then clamped.    Crum removed. Patient assisted to restroom.  Given necessary time to void (20 minutes).    Patient voided 250cc of yellow urine, passing TOV.    To be discharged home without crum catheter.    Kathi Sparks PGY2  discussed with Dr. Suarez, Fellow and Dr. Pierre.

## 2025-01-14 ENCOUNTER — APPOINTMENT (OUTPATIENT)
Dept: UROGYNECOLOGY | Facility: CLINIC | Age: 50
End: 2025-01-14
Payer: COMMERCIAL

## 2025-01-14 VITALS — SYSTOLIC BLOOD PRESSURE: 129 MMHG | HEART RATE: 86 BPM | DIASTOLIC BLOOD PRESSURE: 82 MMHG

## 2025-01-14 DIAGNOSIS — Z98.890 OTHER SPECIFIED POSTPROCEDURAL STATES: ICD-10-CM

## 2025-01-14 PROBLEM — N39.3 STRESS INCONTINENCE (FEMALE) (MALE): Chronic | Status: ACTIVE | Noted: 2024-12-12

## 2025-01-14 PROBLEM — N92.0 EXCESSIVE AND FREQUENT MENSTRUATION WITH REGULAR CYCLE: Chronic | Status: ACTIVE | Noted: 2024-12-12

## 2025-01-14 PROCEDURE — 99024 POSTOP FOLLOW-UP VISIT: CPT

## 2025-02-12 ENCOUNTER — APPOINTMENT (OUTPATIENT)
Dept: UROGYNECOLOGY | Facility: CLINIC | Age: 50
End: 2025-02-12

## 2025-02-12 ENCOUNTER — RESULT CHARGE (OUTPATIENT)
Age: 50
End: 2025-02-12

## 2025-02-12 DIAGNOSIS — Z98.890 OTHER SPECIFIED POSTPROCEDURAL STATES: ICD-10-CM

## 2025-02-12 DIAGNOSIS — R10.2 PELVIC AND PERINEAL PAIN: ICD-10-CM

## 2025-02-12 LAB
BILIRUB UR QL STRIP: NEGATIVE
CLARITY UR: CLEAR
COLLECTION METHOD: NORMAL
GLUCOSE UR-MCNC: NEGATIVE
HCG UR QL: 0.2 EU/DL
HGB UR QL STRIP.AUTO: NEGATIVE
KETONES UR-MCNC: NEGATIVE
LEUKOCYTE ESTERASE UR QL STRIP: NEGATIVE
NITRITE UR QL STRIP: NEGATIVE
PH UR STRIP: 6
PROT UR STRIP-MCNC: NEGATIVE
SP GR UR STRIP: >=1.03

## 2025-02-12 PROCEDURE — 81003 URINALYSIS AUTO W/O SCOPE: CPT | Mod: QW

## 2025-02-12 PROCEDURE — 99024 POSTOP FOLLOW-UP VISIT: CPT

## 2025-04-09 ENCOUNTER — APPOINTMENT (OUTPATIENT)
Dept: UROGYNECOLOGY | Facility: CLINIC | Age: 50
End: 2025-04-09

## 2025-04-09 DIAGNOSIS — R10.2 PELVIC AND PERINEAL PAIN: ICD-10-CM

## 2025-04-09 PROCEDURE — 99213 OFFICE O/P EST LOW 20 MIN: CPT

## 2025-06-13 ENCOUNTER — APPOINTMENT (OUTPATIENT)
Dept: INTERNAL MEDICINE | Facility: CLINIC | Age: 50
End: 2025-06-13
Payer: COMMERCIAL

## 2025-06-13 VITALS
TEMPERATURE: 98.2 F | OXYGEN SATURATION: 97 % | RESPIRATION RATE: 14 BRPM | SYSTOLIC BLOOD PRESSURE: 110 MMHG | DIASTOLIC BLOOD PRESSURE: 77 MMHG | HEART RATE: 83 BPM

## 2025-06-13 PROBLEM — Z87.39 HISTORY OF BACK PAIN: Status: RESOLVED | Noted: 2018-04-20 | Resolved: 2025-06-13

## 2025-06-13 PROBLEM — M21.619 BUNION: Status: ACTIVE | Noted: 2025-06-13

## 2025-06-13 PROCEDURE — 99396 PREV VISIT EST AGE 40-64: CPT

## 2025-06-13 PROCEDURE — 99214 OFFICE O/P EST MOD 30 MIN: CPT | Mod: 25

## 2025-06-13 PROCEDURE — 36415 COLL VENOUS BLD VENIPUNCTURE: CPT

## 2025-06-16 LAB
25(OH)D3 SERPL-MCNC: 15.3 NG/ML
ALBUMIN SERPL ELPH-MCNC: 4.3 G/DL
ALP BLD-CCNC: 64 U/L
ALT SERPL-CCNC: 14 U/L
ANION GAP SERPL CALC-SCNC: 13 MMOL/L
AST SERPL-CCNC: 22 U/L
BILIRUB SERPL-MCNC: 0.3 MG/DL
BUN SERPL-MCNC: 13 MG/DL
CALCIUM SERPL-MCNC: 9.8 MG/DL
CANDIDA VAG CYTO: NOT DETECTED
CHLORIDE SERPL-SCNC: 102 MMOL/L
CHOLEST SERPL-MCNC: 225 MG/DL
CO2 SERPL-SCNC: 23 MMOL/L
CREAT SERPL-MCNC: 0.97 MG/DL
EGFRCR SERPLBLD CKD-EPI 2021: 72 ML/MIN/1.73M2
ESTIMATED AVERAGE GLUCOSE: 108 MG/DL
G VAGINALIS+PREV SP MTYP VAG QL MICRO: NOT DETECTED
GLUCOSE SERPL-MCNC: 107 MG/DL
HBA1C MFR BLD HPLC: 5.4 %
HCT VFR BLD CALC: 42.8 %
HDLC SERPL-MCNC: 76 MG/DL
HGB BLD-MCNC: 13.3 G/DL
LDLC SERPL-MCNC: 137 MG/DL
MCHC RBC-ENTMCNC: 25.4 PG
MCHC RBC-ENTMCNC: 31.1 G/DL
MCV RBC AUTO: 81.8 FL
NONHDLC SERPL-MCNC: 149 MG/DL
PLATELET # BLD AUTO: 308 K/UL
POTASSIUM SERPL-SCNC: 4.1 MMOL/L
PROT SERPL-MCNC: 7.1 G/DL
RBC # BLD: 5.23 M/UL
RBC # FLD: 14.2 %
SODIUM SERPL-SCNC: 139 MMOL/L
T VAGINALIS VAG QL WET PREP: NOT DETECTED
TRIGL SERPL-MCNC: 73 MG/DL
VIT B12 SERPL-MCNC: 555 PG/ML
WBC # FLD AUTO: 7.05 K/UL

## 2025-07-14 ENCOUNTER — NON-APPOINTMENT (OUTPATIENT)
Age: 50
End: 2025-07-14

## 2025-07-15 ENCOUNTER — APPOINTMENT (OUTPATIENT)
Dept: INTERNAL MEDICINE | Facility: CLINIC | Age: 50
End: 2025-07-15
Payer: COMMERCIAL

## 2025-07-15 ENCOUNTER — OUTPATIENT (OUTPATIENT)
Dept: OUTPATIENT SERVICES | Facility: HOSPITAL | Age: 50
LOS: 1 days | End: 2025-07-15
Payer: COMMERCIAL

## 2025-07-15 VITALS — OXYGEN SATURATION: 98 % | HEART RATE: 88 BPM | DIASTOLIC BLOOD PRESSURE: 72 MMHG | SYSTOLIC BLOOD PRESSURE: 115 MMHG

## 2025-07-15 DIAGNOSIS — Z98.82 BREAST IMPLANT STATUS: Chronic | ICD-10-CM

## 2025-07-15 DIAGNOSIS — I10 ESSENTIAL (PRIMARY) HYPERTENSION: ICD-10-CM

## 2025-07-15 PROCEDURE — G0463: CPT

## 2025-07-15 PROCEDURE — 99214 OFFICE O/P EST MOD 30 MIN: CPT

## 2025-07-15 RX ORDER — DICLOFENAC SODIUM 10 MG/G
1 GEL TOPICAL
Qty: 1 | Refills: 3 | Status: ACTIVE | COMMUNITY
Start: 2025-07-15 | End: 1900-01-01

## 2025-07-15 RX ORDER — CYCLOBENZAPRINE HYDROCHLORIDE 5 MG/1
5 TABLET, FILM COATED ORAL
Qty: 14 | Refills: 1 | Status: ACTIVE | COMMUNITY
Start: 2025-07-15 | End: 1900-01-01

## 2025-07-16 ENCOUNTER — APPOINTMENT (OUTPATIENT)
Dept: ORTHOPEDIC SURGERY | Facility: CLINIC | Age: 50
End: 2025-07-16
Payer: COMMERCIAL

## 2025-07-16 PROCEDURE — 99204 OFFICE O/P NEW MOD 45 MIN: CPT

## 2025-07-16 PROCEDURE — 72100 X-RAY EXAM L-S SPINE 2/3 VWS: CPT

## 2025-07-16 RX ORDER — DICLOFENAC SODIUM 75 MG/1
75 TABLET, DELAYED RELEASE ORAL
Qty: 60 | Refills: 0 | Status: ACTIVE | COMMUNITY
Start: 2025-07-16 | End: 1900-01-01

## 2025-07-16 RX ORDER — TIZANIDINE 2 MG/1
2 TABLET ORAL
Qty: 24 | Refills: 1 | Status: ACTIVE | COMMUNITY
Start: 2025-07-16 | End: 1900-01-01

## 2025-07-21 DIAGNOSIS — M54.12 RADICULOPATHY, CERVICAL REGION: ICD-10-CM

## 2025-07-21 DIAGNOSIS — M54.50 LOW BACK PAIN, UNSPECIFIED: ICD-10-CM

## 2025-07-28 ENCOUNTER — OUTPATIENT (OUTPATIENT)
Dept: OUTPATIENT SERVICES | Facility: HOSPITAL | Age: 50
LOS: 1 days | End: 2025-07-28
Payer: COMMERCIAL

## 2025-07-28 ENCOUNTER — APPOINTMENT (OUTPATIENT)
Dept: MRI IMAGING | Facility: CLINIC | Age: 50
End: 2025-07-28
Payer: COMMERCIAL

## 2025-07-28 PROCEDURE — 72148 MRI LUMBAR SPINE W/O DYE: CPT | Mod: 26

## 2025-07-28 PROCEDURE — 72148 MRI LUMBAR SPINE W/O DYE: CPT

## 2025-07-29 ENCOUNTER — LABORATORY RESULT (OUTPATIENT)
Age: 50
End: 2025-07-29

## 2025-08-05 ENCOUNTER — OUTPATIENT (OUTPATIENT)
Dept: OUTPATIENT SERVICES | Facility: HOSPITAL | Age: 50
LOS: 1 days | End: 2025-08-05
Payer: COMMERCIAL

## 2025-08-05 ENCOUNTER — APPOINTMENT (OUTPATIENT)
Dept: MAMMOGRAPHY | Facility: IMAGING CENTER | Age: 50
End: 2025-08-05
Payer: COMMERCIAL

## 2025-08-05 ENCOUNTER — RESULT REVIEW (OUTPATIENT)
Age: 50
End: 2025-08-05

## 2025-08-05 ENCOUNTER — APPOINTMENT (OUTPATIENT)
Dept: ULTRASOUND IMAGING | Facility: IMAGING CENTER | Age: 50
End: 2025-08-05
Payer: COMMERCIAL

## 2025-08-05 DIAGNOSIS — Z98.82 BREAST IMPLANT STATUS: Chronic | ICD-10-CM

## 2025-08-05 DIAGNOSIS — Z00.8 ENCOUNTER FOR OTHER GENERAL EXAMINATION: ICD-10-CM

## 2025-08-05 PROCEDURE — 76641 ULTRASOUND BREAST COMPLETE: CPT

## 2025-08-05 PROCEDURE — 76641 ULTRASOUND BREAST COMPLETE: CPT | Mod: 26,50

## 2025-08-05 PROCEDURE — 77063 BREAST TOMOSYNTHESIS BI: CPT | Mod: 26

## 2025-08-05 PROCEDURE — 77067 SCR MAMMO BI INCL CAD: CPT | Mod: 26

## 2025-08-05 PROCEDURE — 77063 BREAST TOMOSYNTHESIS BI: CPT

## 2025-08-05 PROCEDURE — 77067 SCR MAMMO BI INCL CAD: CPT

## 2025-08-13 ENCOUNTER — APPOINTMENT (OUTPATIENT)
Dept: ORTHOPEDIC SURGERY | Facility: CLINIC | Age: 50
End: 2025-08-13
Payer: COMMERCIAL

## 2025-08-13 DIAGNOSIS — M54.50 LOW BACK PAIN, UNSPECIFIED: ICD-10-CM

## 2025-08-13 PROCEDURE — 99214 OFFICE O/P EST MOD 30 MIN: CPT

## 2025-08-13 RX ORDER — METHYLPREDNISOLONE 4 MG/1
4 TABLET ORAL
Qty: 1 | Refills: 0 | Status: ACTIVE | COMMUNITY
Start: 2025-08-13 | End: 1900-01-01

## 2025-08-21 ENCOUNTER — NON-APPOINTMENT (OUTPATIENT)
Age: 50
End: 2025-08-21

## 2025-08-29 ENCOUNTER — NON-APPOINTMENT (OUTPATIENT)
Age: 50
End: 2025-08-29

## 2025-09-09 ENCOUNTER — TRANSCRIPTION ENCOUNTER (OUTPATIENT)
Age: 50
End: 2025-09-09

## 2025-09-17 ENCOUNTER — APPOINTMENT (OUTPATIENT)
Dept: ORTHOPEDIC SURGERY | Facility: CLINIC | Age: 50
End: 2025-09-17
Payer: COMMERCIAL

## 2025-09-17 DIAGNOSIS — M54.50 LOW BACK PAIN, UNSPECIFIED: ICD-10-CM

## 2025-09-17 PROCEDURE — 99214 OFFICE O/P EST MOD 30 MIN: CPT

## 2025-09-17 RX ORDER — TIZANIDINE 2 MG/1
2 TABLET ORAL EVERY 6 HOURS
Qty: 56 | Refills: 2 | Status: ACTIVE | COMMUNITY
Start: 2025-09-17 | End: 1900-01-01

## 2025-09-17 RX ORDER — DICLOFENAC SODIUM 75 MG/1
75 TABLET, DELAYED RELEASE ORAL
Qty: 30 | Refills: 1 | Status: ACTIVE | COMMUNITY
Start: 2025-09-17 | End: 1900-01-01

## (undated) DEVICE — DRAPE MAYO STAND 30"

## (undated) DEVICE — DRAPE TOWEL BLUE 17" X 24"

## (undated) DEVICE — SOL IRR POUR H2O 250ML

## (undated) DEVICE — SUT POLYSORB 0 18" GS-21

## (undated) DEVICE — SYR LUER LOK 10CC

## (undated) DEVICE — GLV 6.5 PROTEXIS (WHITE)

## (undated) DEVICE — SPECIMEN CONTAINER 100ML

## (undated) DEVICE — NDL HYPO SAFE 18G X 1.5" (PINK)

## (undated) DEVICE — WARMING BLANKET UPPER ADULT

## (undated) DEVICE — LONE STAR RETRACTOR RING 32.5CM X 18.3CM DISP

## (undated) DEVICE — SOL IRR BAG H2O 3000ML

## (undated) DEVICE — TUBING SUCTION 20FT

## (undated) DEVICE — DRAPE LAVH 124" X 30" X125"

## (undated) DEVICE — PREP BETADINE KIT

## (undated) DEVICE — FOLEY CATH 2-WAY 18FR 5CC SILICONE

## (undated) DEVICE — PACK MINOR

## (undated) DEVICE — DRSG KLING 4"

## (undated) DEVICE — SUT POLYSORB 2-0 18" V-20

## (undated) DEVICE — VENODYNE/SCD SLEEVE CALF MEDIUM

## (undated) DEVICE — FOLEY TRAY 16FR 5CC LTX UMETER CLOSED

## (undated) DEVICE — DRSG DERMABOND 0.7ML

## (undated) DEVICE — MEDICATION LABELS W MARKER

## (undated) DEVICE — LAP PAD 18 X 18"

## (undated) DEVICE — LONE STAR ELASTIC STAY HOOK 5MM SHARP

## (undated) DEVICE — POSITIONER FOAM EGG CRATE ULNAR 2PCS (PINK)

## (undated) DEVICE — SUT MAXON 2-0 36" GS-21

## (undated) DEVICE — DRAPE INSTRUMENT POUCH 6.75" X 11"

## (undated) DEVICE — SOL IRR POUR NS 0.9% 500ML

## (undated) DEVICE — TUBING TUR 2 PRONG